# Patient Record
Sex: MALE | Race: WHITE | URBAN - METROPOLITAN AREA
[De-identification: names, ages, dates, MRNs, and addresses within clinical notes are randomized per-mention and may not be internally consistent; named-entity substitution may affect disease eponyms.]

---

## 2017-01-24 RX ORDER — LISINOPRIL AND HYDROCHLOROTHIAZIDE 20; 12.5 MG/1; MG/1
2 TABLET ORAL
Qty: 60 TABLET | Refills: 0 | OUTPATIENT
Start: 2017-01-24

## 2017-01-24 RX ORDER — METOPROLOL SUCCINATE 50 MG/1
TABLET, EXTENDED RELEASE ORAL
Qty: 30 TABLET | Refills: 0 | OUTPATIENT
Start: 2017-01-24

## 2017-01-24 NOTE — TELEPHONE ENCOUNTER
LOV 8/24/16. No future appointment scheduled. Patient supposed to follow up one month after office visit.

## 2017-01-24 NOTE — TELEPHONE ENCOUNTER
Needs refills     Current outpatient prescriptions:   •  LISINOPRIL-HYDROCHLOROTHIAZIDE 20-12.5 MG Oral Tab, TAKE 2 TABLETS BY MOUTH DAILY, Disp: 60 tablet, Rfl: 0  •  METOPROLOL SUCCINATE ER 50 MG Oral Tablet 24 Hr, TAKE 1 TABLET(50 MG) BY MOUTH DAILY, Indra Dawkins

## 2017-01-26 NOTE — TELEPHONE ENCOUNTER
Current outpatient prescriptions:     •  LISINOPRIL-HYDROCHLOROTHIAZIDE 20-12.5 MG Oral Tab, TAKE 2 TABLETS BY MOUTH DAILY, Disp: 60 tablet, Rfl: 0 REFILL     •  METOPROLOL SUCCINATE ER 50 MG Oral Tablet 24 Hr, TAKE 1 TABLET(50 MG) BY MOUTH DAILY, Disp:

## 2017-01-27 RX ORDER — METOPROLOL SUCCINATE 50 MG/1
TABLET, EXTENDED RELEASE ORAL
Qty: 90 TABLET | Refills: 0 | Status: SHIPPED | OUTPATIENT
Start: 2017-01-27

## 2017-01-27 RX ORDER — LISINOPRIL AND HYDROCHLOROTHIAZIDE 20; 12.5 MG/1; MG/1
2 TABLET ORAL
Qty: 180 TABLET | Refills: 0 | Status: SHIPPED | OUTPATIENT
Start: 2017-01-27

## 2017-02-02 RX ORDER — LISINOPRIL AND HYDROCHLOROTHIAZIDE 20; 12.5 MG/1; MG/1
TABLET ORAL
Qty: 60 TABLET | Refills: 1 | Status: SHIPPED | OUTPATIENT
Start: 2017-02-02

## 2022-12-07 ENCOUNTER — OFFICE VISIT (OUTPATIENT)
Dept: OTOLARYNGOLOGY | Facility: CLINIC | Age: 62
End: 2022-12-07
Payer: COMMERCIAL

## 2022-12-07 VITALS — BODY MASS INDEX: 38.08 KG/M2 | HEIGHT: 70 IN | WEIGHT: 266 LBS

## 2022-12-07 DIAGNOSIS — R04.0 EPISTAXIS: Primary | ICD-10-CM

## 2022-12-07 PROCEDURE — 30901 CONTROL OF NOSEBLEED: CPT | Performed by: STUDENT IN AN ORGANIZED HEALTH CARE EDUCATION/TRAINING PROGRAM

## 2022-12-07 PROCEDURE — 3008F BODY MASS INDEX DOCD: CPT | Performed by: STUDENT IN AN ORGANIZED HEALTH CARE EDUCATION/TRAINING PROGRAM

## 2022-12-07 PROCEDURE — 99213 OFFICE O/P EST LOW 20 MIN: CPT | Performed by: STUDENT IN AN ORGANIZED HEALTH CARE EDUCATION/TRAINING PROGRAM

## 2022-12-07 RX ORDER — EUCALYPTUS/PEPPERMINT OIL
1 SOLUTION, NON-ORAL NASAL 2 TIMES DAILY
Qty: 1 EACH | Refills: 0 | Status: SHIPPED | OUTPATIENT
Start: 2022-12-07

## 2024-03-13 ENCOUNTER — OFFICE VISIT (OUTPATIENT)
Dept: OTOLARYNGOLOGY | Facility: CLINIC | Age: 64
End: 2024-03-13

## 2024-03-13 DIAGNOSIS — R04.0 EPISTAXIS: Primary | ICD-10-CM

## 2024-03-13 NOTE — PROGRESS NOTES
Asad Vega is a 63 year old male.   Chief Complaint   Patient presents with    Follow - Up     Here to reevaluate on left side epistaxis, last Saturday last nosebleed        ASSESSMENT AND PLAN:   1. Epistaxis  63-year-old presents in follow-up regarding epistaxis.  He was seen for this issue on December 2022 with cautery performed to the right side at that time.  He has now been having left-sided nosebleeds.  He reports that he was in [place] with a lot of pollen    On exam he had an area of possible crusting and recent bleeding of the left anterior nasal septum.  This area was cauterized today with silver nitrate    Discussed nasal hydration measures with him including ointment and saline spray once or twice a day.  He can return back if needed    The patient indicates understanding of these issues and agrees to the plan.      EXAM:   There were no vitals taken for this visit.    Pertinent exam findings may also be noted above in assessment and plan     System Details   Skin Inspection - Normal.   Constitutional Overall appearance - Normal.   Head/Face Symmetric, TMJ tenderness not present    Eyes EOMI, PERRL   Right ear:  Canal clear, TM intact, no ERLIN   Left ear:  Canal clear, TM intact, no ERLIN   Nose: Septum midline, inferior turbinates not enlarged, nasal valves without collapse    Oral cavity/Oropharynx: No lesions or masses on inspection or palpation, tonsils symmetric    Neck: Soft without LAD, thyroid not enlarged  Voice clear/ no stridor   Other:      Scopes and Procedures:     Procedures:  Control Epistaxis:  Pre-Procedure Care: Consent was obtained. Procedure/Risks were explained. Questions were answered. Correct patient identified. Correct side and site confirmed.   Control of a simple anterior nosebleed was performed. Location of the bleed was Kiesselbach's plexus. The procedure was performed on the left side.  Bleeding site/vessels were treated with AgNO3 cauterization.  Anesthesia used  was topical Lidocaine/epinephrine 4%/1:70550   Patient tolerated the procedure well. Discharge instructions were discussed with the patient/parent. Epistaxis precautions were explained and understood. Use Vaseline and/or nasal saline gel to the affected area TID.         Current Outpatient Medications   Medication Sig Dispense Refill    Misc Natural Product Nasal (PONARIS) Nasal Solution 1 spray by Nasal route 2 (two) times a day. 1 each 0    Metoprolol Succinate ER 50 MG Oral Tablet 24 Hr TAKE 1 TABLET(50 MG) BY MOUTH DAILY 90 tablet 0    LISINOPRIL-HYDROCHLOROTHIAZIDE 20-12.5 MG Oral Tab TAKE 2 TABLETS BY MOUTH DAILY 60 tablet 1    Lisinopril-Hydrochlorothiazide 20-12.5 MG Oral Tab Take 2 tablets by mouth once daily. 180 tablet 0    Metoprolol Succinate ER (TOPROL XL) 50 MG Oral Tablet 24 Hr Take 1 tablet (50 mg total) by mouth daily. 30 tablet 3      Past Medical History:   Diagnosis Date    Unspecified essential hypertension       Social History:  Social History     Socioeconomic History    Marital status:      Spouse name: Alicia    Number of children: 1   Occupational History    Occupation: sales     Employer: [employer]   Tobacco Use    Smoking status: Never    Smokeless tobacco: Never   Substance and Sexual Activity    Alcohol use: Yes     Comment: weekends    Drug use: No   Other Topics Concern     Service No    Blood Transfusions No    Caffeine Concern No    Occupational Exposure No    Hobby Hazards No    Sleep Concern No    Stress Concern No    Weight Concern Yes    Special Diet No    Back Care No    Exercise Yes    Bike Helmet No    Seat Belt Yes    Self-Exams No          Dnak Clay MD  3/13/2024  11:45 AM